# Patient Record
Sex: FEMALE | Race: WHITE
[De-identification: names, ages, dates, MRNs, and addresses within clinical notes are randomized per-mention and may not be internally consistent; named-entity substitution may affect disease eponyms.]

---

## 2018-05-24 ENCOUNTER — HOSPITAL ENCOUNTER (OUTPATIENT)
Dept: HOSPITAL 17 - NEDDLT | Age: 43
Setting detail: OBSERVATION
LOS: 1 days | Discharge: HOME | End: 2018-05-25
Payer: COMMERCIAL

## 2018-05-24 VITALS
TEMPERATURE: 97.8 F | HEART RATE: 68 BPM | RESPIRATION RATE: 16 BRPM | SYSTOLIC BLOOD PRESSURE: 102 MMHG | OXYGEN SATURATION: 95 % | DIASTOLIC BLOOD PRESSURE: 56 MMHG

## 2018-05-24 DIAGNOSIS — Z71.6: ICD-10-CM

## 2018-05-24 DIAGNOSIS — Z79.82: ICD-10-CM

## 2018-05-24 DIAGNOSIS — R07.89: Primary | ICD-10-CM

## 2018-05-24 DIAGNOSIS — F17.210: ICD-10-CM

## 2018-05-24 PROCEDURE — 83735 ASSAY OF MAGNESIUM: CPT

## 2018-05-24 PROCEDURE — 71046 X-RAY EXAM CHEST 2 VIEWS: CPT

## 2018-05-24 PROCEDURE — 85610 PROTHROMBIN TIME: CPT

## 2018-05-24 PROCEDURE — 80053 COMPREHEN METABOLIC PANEL: CPT

## 2018-05-24 PROCEDURE — 96360 HYDRATION IV INFUSION INIT: CPT

## 2018-05-24 PROCEDURE — 93017 CV STRESS TEST TRACING ONLY: CPT

## 2018-05-24 PROCEDURE — 85025 COMPLETE CBC W/AUTO DIFF WBC: CPT

## 2018-05-24 PROCEDURE — 84484 ASSAY OF TROPONIN QUANT: CPT

## 2018-05-24 PROCEDURE — 82550 ASSAY OF CK (CPK): CPT

## 2018-05-24 PROCEDURE — 99285 EMERGENCY DEPT VISIT HI MDM: CPT

## 2018-05-24 PROCEDURE — 82552 ASSAY OF CPK IN BLOOD: CPT

## 2018-05-24 PROCEDURE — 83690 ASSAY OF LIPASE: CPT

## 2018-05-24 PROCEDURE — 85730 THROMBOPLASTIN TIME PARTIAL: CPT

## 2018-05-24 PROCEDURE — G0378 HOSPITAL OBSERVATION PER HR: HCPCS

## 2018-05-24 PROCEDURE — 94664 DEMO&/EVAL PT USE INHALER: CPT

## 2018-05-24 PROCEDURE — 93005 ELECTROCARDIOGRAM TRACING: CPT

## 2018-05-25 VITALS — OXYGEN SATURATION: 97 %

## 2018-05-25 VITALS
SYSTOLIC BLOOD PRESSURE: 112 MMHG | OXYGEN SATURATION: 97 % | HEART RATE: 70 BPM | DIASTOLIC BLOOD PRESSURE: 57 MMHG | RESPIRATION RATE: 16 BRPM | TEMPERATURE: 97.9 F

## 2018-05-25 VITALS — HEART RATE: 63 BPM

## 2018-05-25 VITALS
HEART RATE: 59 BPM | SYSTOLIC BLOOD PRESSURE: 100 MMHG | RESPIRATION RATE: 16 BRPM | TEMPERATURE: 98.2 F | OXYGEN SATURATION: 95 % | DIASTOLIC BLOOD PRESSURE: 56 MMHG

## 2018-05-25 VITALS
RESPIRATION RATE: 20 BRPM | DIASTOLIC BLOOD PRESSURE: 57 MMHG | HEART RATE: 67 BPM | TEMPERATURE: 97.7 F | SYSTOLIC BLOOD PRESSURE: 110 MMHG | OXYGEN SATURATION: 95 %

## 2018-05-25 NOTE — TR
Date Performed: 05/25/2018       Time Performed: 12:24:27

 

DOCTOR:      Renato Mcdonnell 

 

DRUG LIST:     

CLINICAL HISTORY:     

REASON FOR TEST:     

REASON FOR ENDING:     

OBSERVATION:     

CONCLUSION:      Suhas protocol completed. Stopped sec to reaching target heart rate and leg fatigue.
 Maximum JX=439 Target HR Rnyheebc=331.0% Maximum AR=531/56 Total Exercise Time=9:25. No reprod chest
 discomfort. No ectopy. No st t segment changes. Artifact began in stage 2, V6 disconnected. Normal b
p response. Good exercise tolerance. Recovery quick and unremarkable.

COMMENTS:      Conclusion: Normal treadmill exercise.  No evidence of ischemia.

## 2018-05-25 NOTE — HHI.DCPOC
Discharge Care Plan


Diagnosis:  


(1) Atypical chest pain


(2) Situational stress


(3) Tobacco abuse


Goals to Promote Your Health


* To prevent worsening of your condition and complications


* To maintain your health at the optimal level


Directions to Meet Your Goals


*** Take your medications as prescribed


*** Follow your dietary instruction


*** Follow activity as directed








*** Keep your appointments as scheduled


*** Take your immunizations and boosters as scheduled


*** If your symptoms worsen call your PCP, if no PCP go to Urgent Care Center 

or Emergency Room***


*** Smoking is Dangerous to Your Health. Avoid second hand smoke***


***Call the 24-hour hour crisis hotline for domestic abuse at 1-383.804.5093***











Shawanda Haney May 25, 2018 12:58

## 2018-05-25 NOTE — HHI.HP
HPI


Primary Care Physician


Suhas Redding MD


Chief Complaint


Chest pain


History of Present Illness


42 year old female with 2 pack/day smoking history presents to emergency room 

for further evaluation of chest tightness.  Onset upon awakening yesterday 

morning described as "not feeling well," nausea, and x1 nonbloody emesis. 

Proceeded to work a full day and decided to mow her lawn. Endorses she 

continued to not feel well and developed a frontal headache. Attempted to mow 

her lawn before developing moderate to severe chest tightness and pressure 

described as "someone was pushing my back and chest together."  The symptoms 

included dizziness, weakness, and "heart felt like it was flipping." Endorses 

history of palpations and yesterday's episode did not feel particular fast. 

Duration of severe chest pressure approx 1.5 hours.  No known precipitating or 

relieving factors.  Endorses Nitropaste helped ease the pain somewhat although 

not completely resolved. Unable to tell if she is still experiencing chest 

tightness or not, reports "hard to tell." Endorses recent increase stress past 

7 weeks.





Review of Systems


General: No fatigue, weakness, fever, chills, or recent illness. Increase 

stress x7 week, decreased appetite, increase tobacco use, and #35 weight lost. 


HEENT: HA resolved. No vision changes, no nasal congestion or drainage, no 

dysphasia, or history of GERD


CV: As stated above. Dizziness has resolved.


RESP: No SOB, wheeze, hemoptysis, or history of asthma. Current smoker, no 

change in "normal smokers cough."


GI: Nausea and vomiting resolved. Loose stools x4 weeks. No pain, distention, 

melena, or blood in the stool. 


: No dysuria, urgency, frequency, history of frequent UTIs, or history of 

kidney stones


EXT: No lower leg edema, no paraesthesias


MS: No discomfort, injury, or change in ROM


NEURO: No difficulty with balance, LOC, or motor/sensory deficits


PSYCH: Increased anxiety and situational stress x7 weeks, recent break up, and 

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 No suicidal ideation.


SKIN: No rashes, no concerning lesions





Past Family Social History


Allergies:  


Coded Allergies:  


     penicillin G (Unverified  Allergy, Unknown, Anaphylaxis, 18)


Past Medical History


None


Past Surgical History


Cholecystectomy, partial hysterectomy


Reported Medications





Reported Meds & Active Scripts


Active


No Active Prescriptions or Reported Medications


Active Ordered Medications





Current Medications








 Medications


  (Trade)  Dose


 Ordered  Sig/Aniyah


 Route  Start Time


 Stop Time Status Last Admin


 


  (NS Flush)  2 ml  UNSCH  PRN


 IV FLUSH  18 23:45


     


 


 


  (NS Flush)  2 ml  UNSCH  PRN


 IV FLUSH  18 23:45


     


 


 


  (Tylenol)  500 mg  Q4H  PRN


 PO  18 23:45


    18 23:58


 


 


  (Zofran Inj)  4 mg  Q6H  PRN


 IV PUSH  18 23:45


     


 


 


  (Tylenol)  500 mg  Q4H  PRN


 PO  18 07:30


     


 


 


  (Nitrostat Sl)  0.4 mg  Q5M  PRN


 SL  18 07:30


     


 


 


  (Aspirin)  325 mg  DAILY


 PO  18 09:00


     


 








Family History


Father myocardial infarctions beginning in mid 50s.


Social History


No known diabetes, hyperlipidemia, hypertension.


Current 2 pack day smoker.  Rare alcohol use.  Denies any illegal drug use.


Single.  Endorses an active lifestyle. Walks daily between 3-8 miles. 





Past cardiac testing


Remote ETT 5-6 years ago reported to be normal.





Physical Exam


Vital Signs





Vital Signs








  Date Time  Temp Pulse Resp B/P (MAP) Pulse Ox O2 Delivery O2 Flow Rate FiO2


 


18 03:59 98.2 59 16 100/56 (71) 95   


 


18 00:58   16     


 


18 23:41 97.8 68 16 102/56 (71) 95   








Physical Exam


GENERAL: Alert WN, WD, NAD, pleasant, obese,  female


HEAD: NC, AT


CV: RRR, without murmur, rub, gallop, no JVD, S1-S2 no S3-S4.  


RESP: Diminished lungs throughout bilateral, no crackles, wheeze, or rhonchi. 

Symmetrical chest rise, nonlabored, able to speak in full sentences


ABD: Soft, NT, ND, no masses, positive bowel tones


EXT: Pulses +2x4, no dependent edema


MS: Normal tone x4 extremities, no obvious deformities, full range of motion


NEURO: CN II through CN XII grossly intact, motor strength 5/5


PSYCH: A+O x3, flat affect, appropriate speech, mood, insight and judgment


SKIN: Normal turgor, normal texture, no lesions, no rashes


Laboratory





Laboratory Tests








Test


  5/25/18


02:21


 


Troponin I LESS THAN 0.02 








Imaging


Chest x-ray completed in [facility].  Etiology is read as no acute 

cardiopulmonary disease


Course


EKG


Normal sinus rhythm, normal axis, no ST-T segment change





Caprini VTE Risk Assessment


Caprini VTE Risk Assessment:  No/Low Risk (score <= 1)


Caprini Risk Assessment Model











 Point Value = 1          Point Value = 2  Point Value = 3  Point Value = 5


 


Age 41-60


Minor surgery


BMI > 25 kg/m2


Swollen legs


Varicose veins


Pregnancy or postpartum


History of unexplained or recurrent


   spontaneous 


Oral contraceptives or hormone


   replacement


Sepsis (< 1 month)


Serious lung disease, including


   pneumonia (< 1 month)


Abnormal pulmonary function


Acute myocardial infarction


Congestive heart failure (< 1 month)


History of inflammatory bowel disease


Medical patient at bed rest Age 61-74


Arthroscopic surgery


Major open surgery (> 45 min)


Laparoscopic surgery (> 45 min)


Malignancy


Confined to bed (> 72 hours)


Immobilizing plaster cast


Central venous access Age >= 75


History of VTE


Family history of VTE


Factor V Leiden


Prothrombin 38043R


Lupus anticoagulant


Anticardiolipin antibodies


Elevated serum homocysteine


Heparin-induced thrombocytopenia


Other congenital or acquired


   thrombophilia Stroke (< 1 month)


Elective arthroplasty


Hip, pelvis, or leg fracture


Acute spinal cord injury (< 1 month)








Prophylaxis Regimen











   Total Risk


Factor Score Risk Level Prophylaxis Regimen


 


0-1      Low Early ambulation


 


2 Moderate Order ONE of the following:


*Sequential Compression Device (SCD)


*Heparin 5000 units SQ BID


 


3-4 Higher Order ONE of the following medications:


*Heparin 5000 units SQ TID


*Enoxaparin/Lovenox 40 mg SQ daily (WT < 150 kg, CrCl > 30 mL/min)


*Enoxaparin/Lovenox 30 mg SQ daily (WT < 150 kg, CrCl > 10-29 mL/min)


*Enoxaparin/Lovenox 30 mg SQ BID (WT < 150 kg, CrCl > 30 mL/min)


AND/OR


*Sequential Compression Device (SCD)


 


5 or more Highest Order ONE of the following medications:


*Heparin 5000 units SQ TID (Preferred with Epidurals)


*Enoxaparin/Lovenox 40 mg SQ daily (WT < 150 kg, CrCl > 30 mL/min)


*Enoxaparin/Lovenox 30 mg SQ daily (WT < 150 kg, CrCl > 10-29 mL/min)


*Enoxaparin/Lovenox 30 mg SQ BID (WT < 150 kg, CrCl > 30 mL/min)


AND


*Sequential Compression Device (SCD)











Assessment and Plan


Assessment and Plan


#1 Atypical chest pain-admitted chest pain center.  Ruled out 3 sets of EKGs, 

cardiac enzymes, monitor on telemetry overnight.  Will be seen and evaluated by 

Dr. Renato Mcdonnell.  Discussed likely completing exercise stress test later 

this morning.  If unremarkable, plans to be discharged home with follow-up with 

PCP.  Verbalized understanding and agreeable plan of care.


#2 Tobacco use-strongly encouraged and stressed importance of tobacco 

cessation.  Instructed to quit smoking.


#3 Situational stress-continue daily exercise, adapting to healthy well 

balanced diet, increase water intake, tobacco cessation, and getting at least 7-

8 hours of sleep each night.











Shawanda Haney May 25, 2018 07:53

## 2018-05-25 NOTE — EKG
Date Performed: 05/25/2018       Time Performed: 08:00:33

 

PTAGE:      42 years

 

EKG:      Sinus rhythm 

 

 NORMAL ECG

 

PREVIOUS TRACING       : 05/24/2018 18.32 Since previous tracing, no significant change noted

 

DOCTOR:   Renato Mcdonnell  Interpretating Date/Time  05/25/2018 13:56:20

## 2018-05-25 NOTE — EKG
Date Performed: 05/25/2018       Time Performed: 01:15:51

 

PTAGE:      42 years

 

EKG:      Sinus rhythm 

 

 NORMAL ECG Since 

 

 PREVIOUS TRACING            , no significant change noted

 

DOCTOR:   Renato Mcdonnell  Interpretating Date/Time  05/25/2018 10:41:46

## 2018-05-25 NOTE — EKG
Date Performed: 05/25/2018       Time Performed: 00:00:25

 

PTAGE:      42 years

 

EKG:      Sinus rhythm 

 

 NORMAL ECG Since 

 

 PREVIOUS TRACING            , no significant change noted

 

DOCTOR:   Renato Mcdonnell  Interpretating Date/Time  05/25/2018 10:40:09

## 2018-06-24 ENCOUNTER — HOSPITAL ENCOUNTER (EMERGENCY)
Dept: HOSPITAL 17 - PHEFT | Age: 43
Discharge: HOME | End: 2018-06-24
Payer: SELF-PAY

## 2018-06-24 VITALS
HEART RATE: 81 BPM | TEMPERATURE: 97.9 F | SYSTOLIC BLOOD PRESSURE: 113 MMHG | OXYGEN SATURATION: 97 % | RESPIRATION RATE: 16 BRPM | DIASTOLIC BLOOD PRESSURE: 71 MMHG

## 2018-06-24 VITALS — HEIGHT: 62 IN | WEIGHT: 193.12 LBS | BODY MASS INDEX: 35.54 KG/M2

## 2018-06-24 DIAGNOSIS — J44.9: ICD-10-CM

## 2018-06-24 DIAGNOSIS — Z23: ICD-10-CM

## 2018-06-24 DIAGNOSIS — S61.216A: Primary | ICD-10-CM

## 2018-06-24 DIAGNOSIS — F17.210: ICD-10-CM
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
## 2018-06-24 PROCEDURE — 90471 IMMUNIZATION ADMIN: CPT

## 2018-06-24 PROCEDURE — 90714 TD VACC NO PRESV 7 YRS+ IM: CPT

## 2018-06-24 PROCEDURE — 73140 X-RAY EXAM OF FINGER(S): CPT

## 2018-06-24 PROCEDURE — 64450 NJX AA&/STRD OTHER PN/BRANCH: CPT

## 2018-06-24 NOTE — PD
HPI


Chief Complaint:  Laceration/Skin Injury


Time Seen by Provider:  18:59


Travel History


International Travel<30 days:  No


Contact w/Intl Traveler<30days:  No


Traveled to known affect area:  No





History of Present Illness


HPI


43-year-old right-hand-dominant female presents the ED for evaluation of 10/10 

right fifth finger pain.  Onset just before arrival.  Throbbing in quality.  

Worsened by touch and range of motion.  No alleviating factors reported.  

Patient states that she was moving a washing machine  with her dad and her hand 

was caught between the machine and a door.  She denies numbness, tingling, 

weakness of the extremity.  She endorses limitations to range of motion 

secondary to pain.  She has never injured the digit before.  Unsure of her last 

tetanus immunization.  No treatment attempted before arrival.





PFSH


Past Medical History


Heart Rhythm Problems:  No


Cardiac Catheterization:  No


Cardiovascular Problems:  No


High Cholesterol:  No


Congestive Heart Failure:  No


COPD:  Yes


Diabetes:  No


Diminished Hearing:  No


Respiratory:  Yes (copd)


Immunizations Current:  Yes


Tetanus Vaccination:  < 5 Years


Influenza Vaccination:  Yes


Pregnant?:  Not Pregnant


:  9


Para:  3


Miscarriage:  6





Past Surgical History


Cardiac Surgery:  Yes (heart cath)


Cholecystectomy:  Yes


Coronary Artery Bypass Graft:  No


Hysterectomy:  Yes





Social History


Alcohol Use:  Yes (SOCIALLY)


Tobacco Use:  Yes (1 pk)


Substance Use:  No





Allergies-Medications


(Allergen,Severity, Reaction):  


Coded Allergies:  


     penicillin G (Unverified  Allergy, Unknown, Anaphylaxis, 18)


Reported Meds & Prescriptions





Reported Meds & Active Scripts


Active


No Active Prescriptions or Reported Medications    








Review of Systems


Except as stated in HPI:  all other systems reviewed are Neg





Physical Exam


Narrative


GENERAL: Well-nourished, well-developed white female no acute distress.


SKIN: Focused skin assessment warm/dry.


HEAD: Normocephalic.


EYES: No scleral icterus. No injection or drainage. 


NECK: Supple, trachea midline. No JVD or lymphadenopathy.


CARDIOVASCULAR: Regular rate and rhythm without murmurs, gallops, or rubs. 


RESPIRATORY: Breath sounds equal bilaterally. No accessory muscle use.


GASTROINTESTINAL: Abdomen soft, non-tender, nondistended. 


MUSCULOSKELETAL: No cyanosis, or edema. 


FOCUSED RIGHT UPPER EXTREMITY EXAM: 2+ radial pulse.  Superficial laceration on 

the palmar aspect of the proximal fifth digit.  Some tender edema of the PIP 

joint.  No tenderness to palpation of the remaining joints of the hand.  Strong 

finger to thumb opposition with each digit.  Patient retains full, active ROM 

of the digits of the hand.  Neurovascularly intact distally with each finger.


BACK: Nontender without obvious deformity. No CVA tenderness.





Data


Data


Last Documented VS





Vital Signs








  Date Time  Temp Pulse Resp B/P (MAP) Pulse Ox O2 Delivery O2 Flow Rate FiO2


 


18 18:51 97.9 81 16 113/71 (85) 97   








Orders





 Orders


Tetanus/Diphtheria Tox Adult (Tetanus/Di (18 19:15)


Lidocaine Pf 1% Inj (Xylocaine-Mpf 1% In (18 19:15)


Finger (Kil2iom) (18 19:09)


Ice/Cold Pack (18 19:09)


Ibuprofen (Motrin) (18 20:00)


Ed Discharge Order (18 20:28)








MDM


Medical Decision Making


Medical Screen Exam Complete:  Yes


Emergency Medical Condition:  Yes


Differential Diagnosis


Laceration versus fracture versus open fracture versus need for tetanus 

immunization versus other


Narrative Course


43-year-old right-hand-dominant female presents the ED for evaluation of 10/10 

right fifth finger pain. .  Patient states that she was moving a washing 

machine  with her dad and her hand was caught between the machine and a door.  

Unsure of last tetanus immunization.  Vitals reviewed.  Physical exam reveals a 

superficial laceration on the proximal palmar aspect of the fifth digit.  

Tender stimulation was updated.  X-ray reveals no acute bony injury.  Digital 

block was performed with 1% lidocaine.  A total of 2 mL's medication was 

administered.  Adequate anesthesia was obtained.  I explored the wound which 

appears very superficial.  No sutures needed.  Patient was given detailed wound 

care instructions.  She is instructed to continue with OTC medications as 

needed for pain, ice the extremity as needed, return to normal, gentle activity

, monitor for signs of infection.  Patient indicated understanding of 

instructions and is agreeable to the care plan.  The patient is stable and 

discharged home.





Diagnosis





 Primary Impression:  


 Laceration of finger of right hand


 Qualified Codes:  S61.216A - Laceration without foreign body of right little 

finger without damage to nail, initial encounter


Referrals:  


Hand Surgeon





Primary Care Physician





***Additional Instructions:  


Rest, ice, elevate the extremity.


Apply ice no longer than 10-15 minutes per hour a few times a day.


600 mg ibuprofen up to 3 times a day as needed for pain.


Return to normal, gentle activity as tolerated.


Keep the wound clean, dry and covered.


Do not submerge the wound for the next 3-5 days.


You may allow water to run over that area and wash gently with soap.


Allow the wound to air dry 5-10 minutes and apply a small amount of Neosporin 

before applying a clean dry bandage.


Change the bandage anytime it becomes wet or soiled.


Monitor for signs of infection as discussed.


Follow-up with a primary care provider.


Return to the ED for any urgent or emergent medical condition.


Scripts


No Active Prescriptions or Reported Meds


Disposition:  01 DISCHARGE HOME


Condition:  Stable











Milvia Simon 2018 19:12

## 2018-06-24 NOTE — RADRPT
EXAM DATE:  6/24/2018 7:43 PM EDT

AGE/SEX:        43 years / Female



INDICATIONS:  Pain in right 5th PIP joint after smashing finger between a washer and a door while she
 was moving the washer.



CLINICAL DATA:  This is the patient's initial encounter. Patient reports that signs and symptoms have
 been present for 1 day and indicates a pain score of 10/10. 

                                                                          

MEDICAL/SURGICAL HISTORY:       None. None.



COMPARISON:      No prior exams available for comparison. 





FINDINGS:  

Bony structures are intact and in normal alignment.  Joints are intact without dislocation or signifi
cant arthropathy.  Osseous density is normal.  Soft tissues are swollen. No radiopaque foreign bodies
 seen.  



CONCLUSION: 

Soft tissue swelling of the fifth finger. No acute bony abnormality.



 







Electronically signed by: Mika Thomas MD  6/24/2018 8:03 PM EDT